# Patient Record
Sex: MALE | Race: AMERICAN INDIAN OR ALASKA NATIVE
[De-identification: names, ages, dates, MRNs, and addresses within clinical notes are randomized per-mention and may not be internally consistent; named-entity substitution may affect disease eponyms.]

---

## 2017-01-19 ENCOUNTER — HOSPITAL ENCOUNTER (EMERGENCY)
Dept: HOSPITAL 5 - ED | Age: 32
Discharge: LEFT BEFORE DISCHARGE | End: 2017-01-19
Payer: SELF-PAY

## 2017-01-19 VITALS — SYSTOLIC BLOOD PRESSURE: 154 MMHG | DIASTOLIC BLOOD PRESSURE: 104 MMHG

## 2017-01-19 DIAGNOSIS — R07.2: Primary | ICD-10-CM

## 2017-01-19 DIAGNOSIS — Z53.21: ICD-10-CM

## 2017-01-19 DIAGNOSIS — I10: ICD-10-CM

## 2017-01-19 LAB
ANION GAP SERPL CALC-SCNC: 15 MMOL/L
BASOPHILS NFR BLD AUTO: 0.8 % (ref 0–1.8)
BUN SERPL-MCNC: 16 MG/DL (ref 9–20)
BUN/CREAT SERPL: 17.77 %
CALCIUM SERPL-MCNC: 8.9 MG/DL (ref 8.4–10.2)
CHLORIDE SERPL-SCNC: 106.1 MMOL/L (ref 98–107)
CO2 SERPL-SCNC: 26 MMOL/L (ref 22–30)
EOSINOPHIL NFR BLD AUTO: 2.7 % (ref 0–4.3)
GLUCOSE SERPL-MCNC: 99 MG/DL (ref 75–100)
HCT VFR BLD CALC: 47.2 % (ref 35.5–45.6)
HGB BLD-MCNC: 16.1 GM/DL (ref 11.8–15.2)
MCH RBC QN AUTO: 31 PG (ref 28–32)
MCHC RBC AUTO-ENTMCNC: 34 % (ref 32–34)
MCV RBC AUTO: 90 FL (ref 84–94)
PLATELET # BLD: 195 K/MM3 (ref 140–440)
POTASSIUM SERPL-SCNC: 3.9 MMOL/L (ref 3.6–5)
RBC # BLD AUTO: 5.25 M/MM3 (ref 3.65–5.03)
SODIUM SERPL-SCNC: 143 MMOL/L (ref 137–145)
WBC # BLD AUTO: 6.2 K/MM3 (ref 4.5–11)

## 2017-01-19 PROCEDURE — 93010 ELECTROCARDIOGRAM REPORT: CPT

## 2017-01-19 PROCEDURE — 36415 COLL VENOUS BLD VENIPUNCTURE: CPT

## 2017-01-19 PROCEDURE — 85025 COMPLETE CBC W/AUTO DIFF WBC: CPT

## 2017-01-19 PROCEDURE — 84484 ASSAY OF TROPONIN QUANT: CPT

## 2017-01-19 PROCEDURE — 93005 ELECTROCARDIOGRAM TRACING: CPT

## 2017-01-19 PROCEDURE — 80048 BASIC METABOLIC PNL TOTAL CA: CPT

## 2017-01-19 NOTE — EMERGENCY DEPARTMENT REPORT
Chief Complaint: Chest Pain


Stated Complaint: CP


Time Seen by Provider: 01/19/17 10:38





- HPI


History of Present Illness: 





Patient here reports CP that woke him up at 0500 this morning. Denies 

radiation. Pain located to mid sternum and @ 4/10. reports pain is burning. PT 

has HTN and has not taken HCTZ in 6 months..Pain is worst when he takes a deep 

breath





- ROS


Review of Systems: 





All systems are negative unless stated in hpi above





- Exam


Vital Signs: 


 Vital Signs











  01/19/17





  06:23


 


Temperature 98.2 F


 


Pulse Rate 75


 


Respiratory 20





Rate 


 


Blood Pressure 154/104


 


O2 Sat by Pulse 100





Oximetry 











Physical Exam: 





General: This is a 30 yo male well nourished well developed and non toxic in 

appearance


CV: S1S2. RRR


Lungs: CTAB. NL work of breathing























MSE screening note: 


Focused history and physical exam performed.


Due to findings the following was ordered:see [facility]











ED Medical Decision Making





- Lab Data


Result diagrams: 


 01/19/17 06:39





 01/19/17 06:33





- Medical Decision Making





MDM:1: seen by provider in triage


    2: Protocol implemented for  labs and/or Diagnostics


    3: Patient to be seen by provider in main ED





ED Disposition for MSE


Condition: Stable

## 2021-12-23 ENCOUNTER — HOSPITAL ENCOUNTER (EMERGENCY)
Dept: HOSPITAL 5 - ED | Age: 36
Discharge: HOME | End: 2021-12-23
Payer: COMMERCIAL

## 2021-12-23 VITALS — SYSTOLIC BLOOD PRESSURE: 136 MMHG | DIASTOLIC BLOOD PRESSURE: 77 MMHG

## 2021-12-23 DIAGNOSIS — R55: Primary | ICD-10-CM

## 2021-12-23 LAB
BASOPHILS # (AUTO): 0.1 K/MM3 (ref 0–0.1)
BUN SERPL-MCNC: 12 MG/DL (ref 9–20)
BUN/CREAT SERPL: 12 %
CALCIUM SERPL-MCNC: 8.8 MG/DL (ref 8.4–10.2)
EOSINOPHIL # BLD AUTO: 0 K/MM3 (ref 0–0.4)
EOSINOPHIL NFR BLD AUTO: 0.3 % (ref 0–4.3)
HCT VFR BLD CALC: 49 % (ref 35.5–45.6)
HEMOLYSIS INDEX: 5
HGB BLD-MCNC: 15.9 GM/DL (ref 11.8–15.2)
LYMPHOCYTES # BLD AUTO: 1.1 K/MM3 (ref 1.2–5.4)
LYMPHOCYTES NFR BLD AUTO: 13.7 % (ref 13.4–35)
MCHC RBC AUTO-ENTMCNC: 32 % (ref 32–34)
MCV RBC AUTO: 93 FL (ref 84–94)
MONOCYTES # (AUTO): 0.9 K/MM3 (ref 0–0.8)
MONOCYTES % (AUTO): 11.4 % (ref 0–7.3)
PLATELET # BLD: 185 K/MM3 (ref 140–440)
RBC # BLD AUTO: 5.27 M/MM3 (ref 3.65–5.03)

## 2021-12-23 PROCEDURE — 80048 BASIC METABOLIC PNL TOTAL CA: CPT

## 2021-12-23 PROCEDURE — 99284 EMERGENCY DEPT VISIT MOD MDM: CPT

## 2021-12-23 PROCEDURE — 70450 CT HEAD/BRAIN W/O DYE: CPT

## 2021-12-23 PROCEDURE — 71045 X-RAY EXAM CHEST 1 VIEW: CPT

## 2021-12-23 PROCEDURE — 84484 ASSAY OF TROPONIN QUANT: CPT

## 2021-12-23 PROCEDURE — 93005 ELECTROCARDIOGRAM TRACING: CPT

## 2021-12-23 PROCEDURE — 36415 COLL VENOUS BLD VENIPUNCTURE: CPT

## 2021-12-23 PROCEDURE — 85025 COMPLETE CBC W/AUTO DIFF WBC: CPT

## 2021-12-23 NOTE — ELECTROCARDIOGRAPH REPORT
[facility]

                                       

Test Date:    2021               Test Time:    03:37:19

Pat Name:     BRYAN MARTE           Department:   

Patient ID:   [id number]          Room:          

Gender:       M                        Technician:   ELIZABETH

:          1985               Requested By: ANUJ HEATH

Order Number: [id number]              Reading MD:   Riley Wong

                                 Measurements

Intervals                              Axis          

Rate:         95                       P:            37

RI:           165                      QRS:          53

QRSD:         90                       T:            -13

QT:           345                                    

QTc:          433                                    

                           Interpretive Statements

Sinus rhythm

Normal ECG

No previous ECG available for comparison

Electronically Signed On 2021 13:30:18 EST by Riley Wong

## 2021-12-23 NOTE — XRAY REPORT
CHEST 1 VIEW 12/23/2021 4:07 AM



INDICATION / CLINICAL INFORMATION:

Cough. Syncope.



COMPARISON: 

None available.



FINDINGS:



SUPPORT DEVICES: None.

HEART / MEDIASTINUM: No significant abnormality. 

LUNGS / PLEURA: No significant pulmonary abnormality. No significant pleural effusion. No pneumothora
x. 



ADDITIONAL FINDINGS: No significant additional findings.



IMPRESSION:

1. No acute abnormality of the chest.



Signer Name: Esdras Kim MD 

Signed: 12/23/2021 4:26 AM

Workstation Name: [id number]
[id number]

## 2021-12-23 NOTE — EMERGENCY DEPARTMENT REPORT
ED General Adult HPI





- General


Chief complaint: Syncope


Stated complaint: SYNCOPE


Time Seen by Provider: 12/23/21 02:32


Source: EMS


Mode of arrival: Stretcher


Limitations: No Limitations





- History of Present Illness


Initial comments: 





Patient is 36-year-old male with no significant past medical history.  Patient 

presented to the ER via EMS from home for evaluation of syncopal episode.  

Patient said he went to the bathroom and all of a sudden he passed out.  Patient

stated that he was not out for a long period of time.  No jerking movement.  

Patient stated that his wife witnessed the syncopal episode.  He stated that he 

had some congestion runny nose and cough for the last few days and he was taking

several over-the-counter medicine to help.  He is complaining of mild headache. 

Denied any neck pain, chest pain or shortness of breath.  He stated that he 

feels like he is back to normal.





- Related Data


                                    Allergies











Allergy/AdvReac Type Severity Reaction Status Date / Time


 


Penicillins Allergy  Unknown Verified 12/23/21 02:10














ED Review of Systems


ROS: 


Stated complaint: SYNCOPE


Other details as noted in HPI





Comment: All other systems reviewed and negative


Constitutional: denies: chills, fever


Respiratory: cough.  denies: shortness of breath, wheezing


Cardiovascular: denies: chest pain, palpitations


Gastrointestinal: denies: abdominal pain, nausea, vomiting, diarrhea, 

constipation, hematemesis, hematochezia


Musculoskeletal: denies: back pain


Neurological: denies: headache, weakness





ED Past Medical Hx





- Past Medical History


Hx Hypertension: Yes





ED Physical Exam





- General


Limitations: No Limitations


General appearance: alert, in no apparent distress





- Head


Head exam: Present: atraumatic, normocephalic, normal inspection





- Eye


Eye exam: Present: normal appearance, PERRL





- ENT


ENT exam: Present: normal exam, normal orophraynx, mucous membranes moist





- Neck


Neck exam: Present: normal inspection, full ROM.  Absent: tenderness, 

meningismus





- Respiratory


Respiratory exam: Present: normal lung sounds bilaterally





- Cardiovascular


Cardiovascular Exam: Present: regular rate, normal rhythm, normal heart sounds





- GI/Abdominal


GI/Abdominal exam: Present: soft, normal bowel sounds.  Absent: distended, 

tenderness, guarding, rebound, rigid, organomegaly, mass, bruit, hernia





- Extremities Exam


Extremities exam: Present: normal inspection, full ROM, normal capillary refill.

 Absent: tenderness





- Back Exam


Back exam: Present: normal inspection, full ROM.  Absent: CVA tenderness (R), 

CVA tenderness (L)





- Neurological Exam


Neurological exam: Present: alert, oriented X3, CN II-XII intact





- Psychiatric


Psychiatric exam: Present: normal mood





- Skin


Skin exam: Present: warm, intact, normal color





ED Course


                                   Vital Signs











  12/23/21 12/23/21 12/23/21





  02:10 03:44 03:52


 


Temperature 99.1 F  


 


Pulse Rate 104 H  97 H


 


Respiratory 18  20





Rate   


 


Blood Pressure 142/82  136/77





[Left]   


 


O2 Sat by Pulse 97 98 97





Oximetry   














ED Medical Decision Making





- Lab Data


Result diagrams: 


                                 12/23/21 02:48





                                 12/23/21 02:48





- Radiology Data


Radiology results: report reviewed





- Medical Decision Making





Patient is 36-year-old male with no significant past medical history.  Patient 

presented to the ER via EMS from home for evaluation of syncopal episode.  

Patient said he went to the bathroom and all of a sudden he passed out.  Patient

 stated that he was not out for a long period of time.  No jerking movement.  

Patient stated that his wife witnessed the syncopal episode.  He stated that he 

had some congestion runny nose and cough for the last few days and he was taking

 several over-the-counter medicine to help.  He is complaining of mild headache.

  Denied any neck pain, chest pain or shortness of breath.  He stated that he 

feels like he is back to normal.





EKG is unremarkable.  Labs reviewed and is negative for acute finding.  CT brain

 is negative for acute finding.  Patient symptoms most likely related to his 

over the counter medication patient advised to avoid excessive cough medication 

and advised to follow-up with his primary doctor in the next 2 to 3 days and to 

return to the ER if he develop any new symptoms.





Critical care attestation.: 


If time is entered above; I have spent that time in minutes in the direct care 

of this critically ill patient, excluding procedure time.








ED Disposition


Clinical Impression: 


 Syncope





Disposition: 01 HOME / SELF CARE / HOMELESS


Is pt being admited?: No


Condition: Stable


Instructions:  Syncope (ED), Syncope


Referrals: 


PRIMARY CARE,MD [Primary Care Provider] - 3-5 Days

## 2021-12-23 NOTE — CAT SCAN REPORT
CT HEAD WITHOUT CONTRAST



INDICATION / CLINICAL INFORMATION: Altered Mental Status.



TECHNIQUE: All CT scans at this location are performed using CT dose reduction for ALARA by means of 
automated exposure control. 



COMPARISON: None available.



FINDINGS:

BRAIN PARENCHYMA: No acute intracranial hemorrhage. No evidence of recent infarct. No mass effect or 
midline shift.

VENTRICULAR SYSTEM/EXTRA-AXIAL SPACES: Ventricles are normal for age. No extra-axial fluid collection
. 

ORBITS: Normal as visualized.

SKELETAL SYSTEM/SOFT TISSUES: Normal bones and soft tissues.

PARANASAL SINUSES/MASTOID AIR CELLS: No significant abnormality.



ADDITIONAL FINDINGS: None.



IMPRESSION:

1. No acute intracranial abnormality.



Signer Name: Esdras Kim MD 

Signed: 12/23/2021 3:22 AM

Workstation Name: [id number]